# Patient Record
Sex: FEMALE | Race: WHITE
[De-identification: names, ages, dates, MRNs, and addresses within clinical notes are randomized per-mention and may not be internally consistent; named-entity substitution may affect disease eponyms.]

---

## 2018-08-12 ENCOUNTER — HOSPITAL ENCOUNTER (EMERGENCY)
Dept: HOSPITAL 45 - C.EDB | Age: 53
Discharge: HOME | End: 2018-08-12
Payer: COMMERCIAL

## 2018-08-12 VITALS
BODY MASS INDEX: 33.44 KG/M2 | HEIGHT: 62.99 IN | WEIGHT: 188.72 LBS | HEIGHT: 62.99 IN | BODY MASS INDEX: 33.44 KG/M2 | WEIGHT: 188.72 LBS

## 2018-08-12 VITALS — HEART RATE: 89 BPM | OXYGEN SATURATION: 98 % | SYSTOLIC BLOOD PRESSURE: 126 MMHG | DIASTOLIC BLOOD PRESSURE: 78 MMHG

## 2018-08-12 VITALS — TEMPERATURE: 98.06 F

## 2018-08-12 DIAGNOSIS — Z79.899: ICD-10-CM

## 2018-08-12 DIAGNOSIS — Z79.84: ICD-10-CM

## 2018-08-12 DIAGNOSIS — K57.90: ICD-10-CM

## 2018-08-12 DIAGNOSIS — K57.32: Primary | ICD-10-CM

## 2018-08-12 DIAGNOSIS — Z87.891: ICD-10-CM

## 2018-08-12 DIAGNOSIS — E78.5: ICD-10-CM

## 2018-08-12 LAB
ALBUMIN SERPL-MCNC: 4.1 GM/DL (ref 3.4–5)
ALP SERPL-CCNC: 109 U/L (ref 45–117)
ALT SERPL-CCNC: 56 U/L (ref 12–78)
AST SERPL-CCNC: 26 U/L (ref 15–37)
BASOPHILS # BLD: 0.01 K/UL (ref 0–0.2)
BASOPHILS NFR BLD: 0.2 %
BUN SERPL-MCNC: 13 MG/DL (ref 7–18)
CALCIUM SERPL-MCNC: 9.7 MG/DL (ref 8.5–10.1)
CO2 SERPL-SCNC: 28 MMOL/L (ref 21–32)
CREAT SERPL-MCNC: 0.96 MG/DL (ref 0.6–1.2)
EOS ABS #: 0.05 K/UL (ref 0–0.5)
EOSINOPHIL NFR BLD AUTO: 284 K/UL (ref 130–400)
GLUCOSE SERPL-MCNC: 110 MG/DL (ref 70–99)
HCT VFR BLD CALC: 41.3 % (ref 37–47)
HGB BLD-MCNC: 13.9 G/DL (ref 12–16)
IG#: 0 K/UL (ref 0–0.02)
IMM GRANULOCYTES NFR BLD AUTO: 36.3 %
LIPASE: 174 U/L (ref 73–393)
LYMPHOCYTES # BLD: 2.3 K/UL (ref 1.2–3.4)
MCH RBC QN AUTO: 29.1 PG (ref 25–34)
MCHC RBC AUTO-ENTMCNC: 33.7 G/DL (ref 32–36)
MCV RBC AUTO: 86.4 FL (ref 80–100)
MONO ABS #: 0.49 K/UL (ref 0.11–0.59)
MONOCYTES NFR BLD: 7.7 %
NEUT ABS #: 3.49 K/UL (ref 1.4–6.5)
NEUTROPHILS # BLD AUTO: 0.8 %
NEUTROPHILS NFR BLD AUTO: 55 %
PMV BLD AUTO: 10.7 FL (ref 7.4–10.4)
POTASSIUM SERPL-SCNC: 3.6 MMOL/L (ref 3.5–5.1)
PROT SERPL-MCNC: 8.5 GM/DL (ref 6.4–8.2)
RED CELL DISTRIBUTION WIDTH CV: 14.1 % (ref 11.5–14.5)
RED CELL DISTRIBUTION WIDTH SD: 44.5 FL (ref 36.4–46.3)
SODIUM SERPL-SCNC: 138 MMOL/L (ref 136–145)
WBC # BLD AUTO: 6.34 K/UL (ref 4.8–10.8)

## 2018-08-12 NOTE — DIAGNOSTIC IMAGING REPORT
CT OF THE ABDOMEN AND PELVIS WITH CONTRAST



CLINICAL HISTORY: Left lower quadrant abdominal pain and diarrhea    



COMPARISON STUDY:  CT of the abdomen and pelvis October 19, 2006.



TECHNIQUE: Following IV administration of 114 mL of Optiray-320, axial images of

the abdomen and pelvis were obtained from the lung bases to the proximal femurs.

Images were reviewed in the axial, sagittal, and coronal planes. IV contrast was

administered without complication.  A dose lowering technique was utilized

adhering to the principles of ALARA. Oral contrast was administered.





CT DOSE: 806.32 mGy.cm



FINDINGS: Bilateral breast implants are partially imaged. There is probable

fatty infiltration of the liver. No biliary ductal dilatation is identified

status post cholecystectomy. The spleen, adrenal glands and right kidney are

normal. There are suspected left-sided parapelvic cysts. No pneumatosis, free

air or portal venous gas is present. There is an inflamed diverticulum of the

posterior aspect of the distal descending colon with mild adjacent infiltration.

No free air or abscess is present. No lymphadenopathy is present. There are no

suspicious osseous lesions.







IMPRESSION:  Acute mild diverticulitis of the distal descending colon. No free

air or abscess.







Electronically signed by:  Med Pineda M.D.

8/12/2018 10:26 PM



Dictated Date/Time:  8/12/2018 10:21 PM

## 2018-08-13 NOTE — EMERGENCY ROOM VISIT NOTE
History


First contact with patient:  18:45


Chief Complaint:  ABDOMINAL PAIN


Stated Complaint:  SEVERE ABD LLQ PAIN,CONSTIPATION, D'D SEVERE DIARH





History of Present Illness


The patient is a 52 year old female who presents to the Emergency Room with 

complaints of worsening left lower quadrant pain watery diarrhea and nausea 

without vomiting.  The patient reports that she started to develop discomfort 

approximately 3 weeks ago.  She thought that her pain was secondary to 

constipation as she has a history of the same.  She then started to develop 

diarrhea approximately 5 days ago, and worsening left lower quadrant pain.  She 

was seen by Citlaly Buckner PA-C on Friday who is concern for possible early 

diverticulitis or colitis.  The patient was offered a CT scan, or empiric 

treatment with antibiotics.  The patient elected antibiotic treatment, and was 

prescribed Augmentin as the patient has a history of an allergy to Cipro.  When 

the patient's symptoms worsen, she was seen today by Dr. Craig and provided a 

prescription for Flagyl.  She was also provided an order for a CT scan to be 

performed this week.  Because the patient's symptoms were worsening, her PCP 

told her to come to the emergency department if her symptoms progressively 

worsened throughout the evening, which it did.  Patient reports that she did 

have an oral temperature yesterday of 99.3F.  The patient has had a prior 

history of colonoscopies showing diverticulosis.  She has never had 

diverticulitis in the past.  She is also status post breast cancer with 

bilateral mastectomies, total abdominal hysterectomy with salpingo-oophorectomy 

secondary to extensive adhesive disease.  She is also status post appendectomy 

and cholecystectomy.  She has not noticed any urinary symptoms.  She denies any 

recent antibiotic use or history of C. difficile colitis.  The patient is on 

municip[place] water.  She denies any recent travel or eating any undercooked foods.

  She denies any other sick contacts.  She rates her discomfort an 8 out of 10.





Review of Systems


10 system review was performed and was negative except for pertinent positives 

and negatives as indicated in history of present illness





Past Medical/Surgical History


Medical Problems:


(1) Anemia due to acute blood loss


(2) DCIS (ductal carcinoma in situ) of breast


(3) Dyslipidemia


(4) Fibroid uterus


(5) HX: breast cancer


(6) Pre-diabetes








Family History


Unremarkable





Social History


Smoking Status:  Former Smoker


Alcohol Use:  none


Marital Status:  


Housing Status:  lives with family


Occupation Status:  employed





Current/Historical Medications


Scheduled


Amoxicillin & Pot Clavulanate (Augmentin 875-125 mg), 1 TAB PO BID


Atorvastatin (Lipitor), 10 MG PO HS


B-Complex Vitamins (Vitamin B Complex), 1 TAB PO QAM


Bupropion (Wellbutrin Sr), 150 MG PO DAILY


Cholecalciferol (D 2000), 4,000 INTER.UNIT PO DAILY


Lisinopril (Prinivil), 10 MG PO QAM


Metformin Hcl (Glucophage), 500 MG PO BID


Metronidazole (Flagyl), 1 TAB PO UD


Multivitamin (Multivitamin), 1 TAB PO QAM


Omeprazole (Prilosec), 20 MG PO HS


Verapamil Sust Rel (Calan Sr Ext Rel), 240 MG PO QAM





Physical Exam


Vital Signs











  Date Time  Temp Pulse Resp B/P (MAP) Pulse Ox O2 Delivery O2 Flow Rate FiO2


 


8/12/18 23:00  89 18 126/78 98 Room Air  


 


8/12/18 21:00  78 18 126/77 98 Room Air  


 


8/12/18 20:30  89  132/80 98 Room Air  


 


8/12/18 18:40 36.7 109 20 148/91 97 Room Air  











Physical Exam


CONSTITUTIONAL:  Healthy and well nourished.  Alert and oriented X 3 with 

positive affect.  Patient does not appear acutely ill or toxic.


HEENT:  Normocephalic, atraumatic.  Pupils equal, round and reactive.  No 

scleral icterus or conjunctival injection/pallor.


NECK:  Full active range of motion without discomfort.


RESPIRATORY:  Clear to auscultation bilaterally with no wheezing, crackles, 

rhonchi or stridor.


CARDIOVASCULAR:  Regular rate and rhythm with no murmurs, rubs or gallops.


GASTROINTESTINAL:  Bowel sounds present in all quadrants.  Patient is notably 

tender to palpation in the left lower quadrant.  No obvious rigidity, guarding 

or rebound.  Negative McBurney's point tenderness.  Negative CVA tenderness.


MUSCULOSKELETAL:  Full range of motion of all joints without discomfort.  No 

worsening pain with range of motion of the left hip, or palpation of the lower 

lumbar spine, paraspinous muscles or SI joint.


INTEGUMENTARY:  No rash or other significant dermatologic conditions noted.


HEMATOLOGIC: No ecchymosis or petechiae.


NEUROLOGIC:  No focal neurologic deficits noted.





Medical Decision & Procedures


ER Provider


Diagnostic Interpretation:


Enhanced CT of the abdomen and pelvis shows a mild diverticulitis without 

evidence for abscess or perforation.  Radiologist report is as follows:





CT OF THE ABDOMEN AND PELVIS WITH CONTRAST





CLINICAL HISTORY: Left lower quadrant abdominal pain and diarrhea    





COMPARISON STUDY:  CT of the abdomen and pelvis October 19, 2006.





TECHNIQUE: Following IV administration of 114 mL of Optiray-320, axial images of


the abdomen and pelvis were obtained from the lung bases to the proximal femurs.


Images were reviewed in the axial, sagittal, and coronal planes. IV contrast was


administered without complication.  A dose lowering technique was utilized


adhering to the principles of ALARA. Oral contrast was administered.








CT DOSE: 806.32 mGy.cm





FINDINGS: Bilateral breast implants are partially imaged. There is probable


fatty infiltration of the liver. No biliary ductal dilatation is identified


status post cholecystectomy. The spleen, adrenal glands and right kidney are


normal. There are suspected left-sided parapelvic cysts. No pneumatosis, free


air or portal venous gas is present. There is an inflamed diverticulum of the


posterior aspect of the distal descending colon with mild adjacent infiltration.


No free air or abscess is present. No lymphadenopathy is present. There are no


suspicious osseous lesions.











IMPRESSION:  Acute mild diverticulitis of the distal descending colon. No free


air or abscess.





Laboratory Results


8/12/18 19:32








Red Blood Count 4.78, Mean Corpuscular Volume 86.4, Mean Corpuscular Hemoglobin 

29.1, Mean Corpuscular Hemoglobin Concent 33.7, Mean Platelet Volume 10.7, 

Neutrophils (%) (Auto) 55.0, Lymphocytes (%) (Auto) 36.3, Monocytes (%) (Auto) 

7.7, Eosinophils (%) (Auto) 0.8, Basophils (%) (Auto) 0.2, Neutrophils # (Auto) 

3.49, Lymphocytes # (Auto) 2.30, Monocytes # (Auto) 0.49, Eosinophils # (Auto) 

0.05, Basophils # (Auto) 0.01





8/12/18 19:32








8/12/18 20:37

















Test


  8/12/18


19:30 8/12/18


19:32 8/12/18


20:37


 


Urine Color YELLOW   


 


Urine Appearance CLOUDY (CLEAR)   


 


Urine pH 7.5 (4.5-7.5)   


 


Urine Specific Gravity


  1.020


(1.000-1.030) 


  


 


 


Urine Protein NEG (NEG)   


 


Urine Glucose (UA) NEG (NEG)   


 


Urine Ketones NEG (NEG)   


 


Urine Occult Blood NEG (NEG)   


 


Urine Nitrite NEG (NEG)   


 


Urine Bilirubin NEG (NEG)   


 


Urine Urobilinogen NEG (NEG)   


 


Urine Leukocyte Esterase SMALL (NEG)   


 


Urine WBC (Auto)


  5-10 /hpf


(0-5) 


  


 


 


Urine RBC (Auto) 0-4 /hpf (0-4)   


 


Urine Hyaline Casts (Auto) 1-5 /lpf (0-5)   


 


Urine Epithelial Cells (Auto) >30 /lpf (0-5)   


 


Urine Bacteria (Auto) NEG (NEG)   


 


Urine Crystals


  CALCIUM


OXALATE (NONE 


  


 


 


White Blood Count


  


  6.34 K/uL


(4.8-10.8) 


 


 


Red Blood Count


  


  4.78 M/uL


(4.2-5.4) 


 


 


Hemoglobin


  


  13.9 g/dL


(12.0-16.0) 


 


 


Hematocrit  41.3 % (37-47)  


 


Mean Corpuscular Volume


  


  86.4 fL


() 


 


 


Mean Corpuscular Hemoglobin


  


  29.1 pg


(25-34) 


 


 


Mean Corpuscular Hemoglobin


Concent 


  33.7 g/dl


(32-36) 


 


 


Platelet Count


  


  284 K/uL


(130-400) 


 


 


Mean Platelet Volume


  


  10.7 fL


(7.4-10.4) 


 


 


Neutrophils (%) (Auto)  55.0 %  


 


Lymphocytes (%) (Auto)  36.3 %  


 


Monocytes (%) (Auto)  7.7 %  


 


Eosinophils (%) (Auto)  0.8 %  


 


Basophils (%) (Auto)  0.2 %  


 


Neutrophils # (Auto)


  


  3.49 K/uL


(1.4-6.5) 


 


 


Lymphocytes # (Auto)


  


  2.30 K/uL


(1.2-3.4) 


 


 


Monocytes # (Auto)


  


  0.49 K/uL


(0.11-0.59) 


 


 


Eosinophils # (Auto)


  


  0.05 K/uL


(0-0.5) 


 


 


Basophils # (Auto)


  


  0.01 K/uL


(0-0.2) 


 


 


RDW Standard Deviation


  


  44.5 fL


(36.4-46.3) 


 


 


RDW Coefficient of Variation


  


  14.1 %


(11.5-14.5) 


 


 


Immature Granulocyte % (Auto)  0.0 %  


 


Immature Granulocyte # (Auto)


  


  0.00 K/uL


(0.00-0.02) 


 


 


Anion Gap


  


  8.0 mmol/L


(3-11) 


 


 


Est Creatinine Clear Calc


Drug Dose 


  70.3 ml/min 


  


 


 


Estimated GFR (


American) 


  78.3 


  


 


 


Estimated GFR (Non-


American 


  67.5 


  


 


 


BUN/Creatinine Ratio  13.7 (10-20)  


 


Calcium Level


  


  9.7 mg/dl


(8.5-10.1) 


 


 


Total Bilirubin


  


  0.5 mg/dl


(0.2-1) 


 


 


Alanine Aminotransferase


(ALT/SGPT) 


  56 U/L (12-78) 


  


 


 


Alkaline Phosphatase


  


  109 U/L


() 


 


 


Total Protein


  


  8.5 gm/dl


(6.4-8.2) 


 


 


Albumin


  


  4.1 gm/dl


(3.4-5.0) 


 


 


Lipase


  


  174 U/L


() 


 


 


Direct Bilirubin


  


  


  0.1 mg/dl


(0-0.2)


 


Aspartate Amino Transf


(AST/SGOT) 


  


  26 U/L (15-37) 


 














 Date/Time


Source Procedure


Growth Status


 


 


 8/12/18 20:39


Stool C.difficile Toxin B Gene (PCR) - Final


No C. difficile toxin B gene detected Complete











The above labs were reviewed and grossly normal.  LFTs and lipase are normal.  

Urinalysis is not consistent with infection.  Stool Hemoccult is negative.  C. 

difficile screen is negative.  Stool cultures and smear were ordered and 

pending at the time of dictation.





Medications Administered











 Medications


  (Trade)  Dose


 Ordered  Sig/Angelica


 Route  Start Time


 Stop Time Status Last Admin


Dose Admin


 


 Sodium Chloride  1,000 ml @ 


 999 mls/hr  Q1H1M STAT


 IV  8/12/18 18:58


 8/12/18 19:58 DC 8/12/18 19:33


999 MLS/HR


 


 Ondansetron HCl


  (Zofran Inj)  4 mg  NOW  STAT


 IV  8/12/18 18:58


 8/12/18 19:02 DC 8/12/18 19:33


4 MG











ED Course


Patient history and physical exam were performed.  Nurse's notes were reviewed.

  Vital signs were reviewed, showing an elevated blood pressure 148/91.  

Patient is also mildly tachycardic at 109 bpm.  She is afebrile.  IV access was 

established, and labs were drawn.  The patient was hydrated with normal saline, 

and was administered IV morphine and Zofran for pain and nausea.  Labs were 

reviewed and were grossly normal.  C. difficile screen is negative.  Stool 

cultures were ordered and pending.  Enhanced CT of the abdomen and pelvis shows 

a mild diverticulitis without perforation, abscess or other acute intra-

abdominal etiologies.





The patient is currently taking Augmentin and Flagyl.  I will allow her PCP to 

guide further antibiotic treatment.  She was encouraged to follow-up with her 

PCP in the next 2-3 days to review stool cultures.  She was instructed to 

return to the emergency department for any progressively worsening pain or 

developing fever.  The patient reported that she has Zofran at home as needed 

for nausea, and refused any additional prescription analgesics.  The patient 

was happy with plan of care, and rated her discomfort a 2 out of 10 at the time 

of discharge.  The patient's blood pressure had also normalized throughout her 

ED evaluation.





Medical Decision


Patient presents to the emergency department with complaint of worsening left 

lower quadrant abdominal pain.  The patient has a known history of 

diverticulosis on colonoscopy.  She has already been started on Augmentin and 

Flagyl antibiotics.  Her CT scan today is consistent with a mild 

diverticulitis.  There is no evidence for perforation or abscess.  Additional 

laboratory studies are not suggestive of pancreatitis, hepatitis or UTI.  The 

patient is status post appendectomy, cholecystectomy, hysterectomy with 

bilateral salpingo-oophorectomy.  I also do not suspect musculoskeletal 

etiology.





Medication Reconcilliation


Current Medication List:  was personally reviewed by me





Blood Pressure Screening


Patient's blood pressure:  Normal blood pressure





Impression





 Primary Impression:  


 Diverticulitis





Departure Information


Dispostion


Home / Self-Care





Condition


FAIR





Forms


Call Back Authorization, HOME CARE DOCUMENTATION FORM,                         

                                       IMPORTANT VISIT INFORMATION





Patient Instructions


My [facility], ED Diverticulitis





Additional Instructions





Continue with your current antibiotic treatment.


[place] diet.  Remain well-hydrated.


Read provided handout regarding diverticulitis.


Follow-up with your family doctor for recheck in 3 days.


Return to the emergency department for any progressively worsening pain or 

developing fever.